# Patient Record
Sex: MALE | Race: WHITE | URBAN - METROPOLITAN AREA
[De-identification: names, ages, dates, MRNs, and addresses within clinical notes are randomized per-mention and may not be internally consistent; named-entity substitution may affect disease eponyms.]

---

## 2017-06-01 DIAGNOSIS — R19.7 DIARRHEA, UNSPECIFIED TYPE: ICD-10-CM

## 2017-06-01 LAB
OCCULT BLOOD QC: NORMAL
OCCULT BLOOD SCREENING: NORMAL

## 2018-11-26 ENCOUNTER — TELEPHONE (OUTPATIENT)
Dept: PULMONOLOGY | Age: 43
End: 2018-11-26

## 2018-11-27 ENCOUNTER — OFFICE VISIT (OUTPATIENT)
Dept: PULMONOLOGY | Age: 43
End: 2018-11-27

## 2018-11-27 VITALS
SYSTOLIC BLOOD PRESSURE: 128 MMHG | HEIGHT: 73 IN | HEART RATE: 85 BPM | BODY MASS INDEX: 36.31 KG/M2 | WEIGHT: 274 LBS | DIASTOLIC BLOOD PRESSURE: 80 MMHG | OXYGEN SATURATION: 97 %

## 2018-11-27 DIAGNOSIS — E66.9 NON MORBID OBESITY, UNSPECIFIED OBESITY TYPE: ICD-10-CM

## 2018-11-27 DIAGNOSIS — K21.9 GERD WITHOUT ESOPHAGITIS: Chronic | ICD-10-CM

## 2018-11-27 DIAGNOSIS — G47.33 OBSTRUCTIVE SLEEP APNEA SYNDROME: Primary | Chronic | ICD-10-CM

## 2018-11-27 PROCEDURE — 99213 OFFICE O/P EST LOW 20 MIN: CPT | Performed by: INTERNAL MEDICINE

## 2018-11-27 ASSESSMENT — ENCOUNTER SYMPTOMS
STRIDOR: 0
PHOTOPHOBIA: 0
SHORTNESS OF BREATH: 0
EYE PAIN: 0
CHEST TIGHTNESS: 0
SINUS PRESSURE: 0

## 2018-11-27 ASSESSMENT — SLEEP AND FATIGUE QUESTIONNAIRES
HOW LIKELY ARE YOU TO NOD OFF OR FALL ASLEEP IN A CAR, WHILE STOPPED FOR A FEW MINUTES IN TRAFFIC: 0
HOW LIKELY ARE YOU TO NOD OFF OR FALL ASLEEP WHILE SITTING AND READING: 1
ESS TOTAL SCORE: 5
HOW LIKELY ARE YOU TO NOD OFF OR FALL ASLEEP WHILE SITTING AND TALKING TO SOMEONE: 0
HOW LIKELY ARE YOU TO NOD OFF OR FALL ASLEEP WHILE SITTING QUIETLY AFTER LUNCH WITHOUT ALCOHOL: 0
HOW LIKELY ARE YOU TO NOD OFF OR FALL ASLEEP WHILE WATCHING TV: 2
HOW LIKELY ARE YOU TO NOD OFF OR FALL ASLEEP WHILE SITTING INACTIVE IN A PUBLIC PLACE: 0
HOW LIKELY ARE YOU TO NOD OFF OR FALL ASLEEP WHILE LYING DOWN TO REST IN THE AFTERNOON WHEN CIRCUMSTANCES PERMIT: 1
HOW LIKELY ARE YOU TO NOD OFF OR FALL ASLEEP WHEN YOU ARE A PASSENGER IN A CAR FOR AN HOUR WITHOUT A BREAK: 1

## 2018-11-27 NOTE — PROGRESS NOTES
shortness of breath and stridor. Cardiovascular: Negative for chest pain, palpitations and leg swelling. Musculoskeletal: Negative for neck pain and neck stiffness. Psychiatric/Behavioral: Positive for sleep disturbance. Vitals:  Weight BMI   Wt Readings from Last 3 Encounters:   11/27/18 274 lb (124.3 kg)   05/30/17 263 lb 14.3 oz (119.7 kg)   02/17/16 264 lb (119.7 kg)    Body mass index is 36.15 kg/m².      BP HR SaO2   BP Readings from Last 3 Encounters:   11/27/18 128/80   05/30/17 126/64   02/17/16 (!) 129/92    Pulse Readings from Last 3 Encounters:   11/27/18 85   05/30/17 100   02/17/16 77    SpO2 Readings from Last 3 Encounters:   11/27/18 97%   05/30/17 94%   02/17/16 96%          Electronically signed by Charbel Pacheco MD on 11/27/2018 at 3:13 PM

## 2018-11-27 NOTE — LETTER
[facility]  [address]  [facility] [address]  Phone: [phone]  Fax: [phone]    November 27, 2018       Patient: Renee Glover   MR Number: [id number]   YOB: 1975   Date of Visit: 11/27/2018       Cedric Tinajero was seen for a follow up visit today. Here is my assessment and plan as well as an attached copy of his visit today:    Obstructive sleep apnea syndrome  Chronic- worsening. Reviewed compliance download with pt. Supplies and parts as needed for his machine. These are medically necessary. Continue medications per his PCP and other physicians. Limit caffeine use after 3pm.       GERD without esophagitis  Chronic- Stable. Cont meds per PCP and other physicians. Non morbid obesity, unspecified obesity type  Chronic-Stable. Encouraged him to work on weight loss through diet and exercise. If you have questions or concerns, please do not hesitate to call me. I look forward to following Judi Batista along with you.     Sincerely,    Kami Hazel MD    CC providers:  Aramis Orellana MD  [address] [place] [address] Street: [phone]

## 2019-02-19 ENCOUNTER — OFFICE VISIT (OUTPATIENT)
Dept: PULMONOLOGY | Age: 44
End: 2019-02-19
Payer: COMMERCIAL

## 2019-02-19 VITALS
DIASTOLIC BLOOD PRESSURE: 80 MMHG | HEIGHT: 73 IN | HEART RATE: 81 BPM | WEIGHT: 285 LBS | SYSTOLIC BLOOD PRESSURE: 130 MMHG | OXYGEN SATURATION: 98 % | BODY MASS INDEX: 37.77 KG/M2

## 2019-02-19 DIAGNOSIS — K21.9 GASTROESOPHAGEAL REFLUX DISEASE WITHOUT ESOPHAGITIS: Chronic | ICD-10-CM

## 2019-02-19 DIAGNOSIS — G47.33 OBSTRUCTIVE SLEEP APNEA SYNDROME: Primary | Chronic | ICD-10-CM

## 2019-02-19 DIAGNOSIS — E66.9 CLASS 2 OBESITY WITHOUT SERIOUS COMORBIDITY WITH BODY MASS INDEX (BMI) OF 37.0 TO 37.9 IN ADULT, UNSPECIFIED OBESITY TYPE: Chronic | ICD-10-CM

## 2019-02-19 PROBLEM — E78.00 PURE HYPERCHOLESTEROLEMIA: Status: ACTIVE | Noted: 2017-06-06

## 2019-02-19 PROBLEM — L57.0 SENILE HYPERKERATOSIS: Status: ACTIVE | Noted: 2019-02-19

## 2019-02-19 PROBLEM — M23.41 LOOSE BODY OF RIGHT KNEE: Status: ACTIVE | Noted: 2018-08-13

## 2019-02-19 PROBLEM — B07.0 VERRUCA PLANTARIS: Status: ACTIVE | Noted: 2019-02-19

## 2019-02-19 PROBLEM — M17.11 PRIMARY OSTEOARTHRITIS OF RIGHT KNEE: Status: ACTIVE | Noted: 2018-08-13

## 2019-02-19 PROBLEM — D23.5 BENIGN NEOPLASM OF SKIN OF TRUNK, EXCEPT SCROTUM: Status: ACTIVE | Noted: 2019-02-19

## 2019-02-19 PROBLEM — L81.9 DYSCHROMIA: Status: ACTIVE | Noted: 2019-02-19

## 2019-02-19 PROBLEM — B07.9 WARTS: Status: ACTIVE | Noted: 2019-02-19

## 2019-02-19 PROCEDURE — 99213 OFFICE O/P EST LOW 20 MIN: CPT | Performed by: NURSE PRACTITIONER

## 2019-02-19 ASSESSMENT — ENCOUNTER SYMPTOMS
COUGH: 0
RHINORRHEA: 0
ABDOMINAL DISTENTION: 0
APNEA: 0
SHORTNESS OF BREATH: 0
EYE PAIN: 0
SINUS PRESSURE: 0
EYE REDNESS: 0
ABDOMINAL PAIN: 0

## 2019-02-19 ASSESSMENT — SLEEP AND FATIGUE QUESTIONNAIRES
HOW LIKELY ARE YOU TO NOD OFF OR FALL ASLEEP WHILE LYING DOWN TO REST IN THE AFTERNOON WHEN CIRCUMSTANCES PERMIT: 1
HOW LIKELY ARE YOU TO NOD OFF OR FALL ASLEEP IN A CAR, WHILE STOPPED FOR A FEW MINUTES IN TRAFFIC: 0
HOW LIKELY ARE YOU TO NOD OFF OR FALL ASLEEP WHILE SITTING AND READING: 2
HOW LIKELY ARE YOU TO NOD OFF OR FALL ASLEEP WHILE WATCHING TV: 2
ESS TOTAL SCORE: 6
HOW LIKELY ARE YOU TO NOD OFF OR FALL ASLEEP WHILE SITTING AND TALKING TO SOMEONE: 0
HOW LIKELY ARE YOU TO NOD OFF OR FALL ASLEEP WHEN YOU ARE A PASSENGER IN A CAR FOR AN HOUR WITHOUT A BREAK: 1
HOW LIKELY ARE YOU TO NOD OFF OR FALL ASLEEP WHILE SITTING QUIETLY AFTER LUNCH WITHOUT ALCOHOL: 0
HOW LIKELY ARE YOU TO NOD OFF OR FALL ASLEEP WHILE SITTING INACTIVE IN A PUBLIC PLACE: 0

## 2019-04-09 ENCOUNTER — TELEPHONE (OUTPATIENT)
Dept: PULMONOLOGY | Age: 44
End: 2019-04-09

## 2019-04-09 NOTE — TELEPHONE ENCOUNTER
LM ON VM - NEEDING TO RESCHEDULE SINCE HE DID NOT RECEIVE A NEW MACHINE HE WILL JUST NEED TO BE RESCHEDULE A YEAR FROM HIS LAST VISIT WHICH WAS 02/19/19 [email or web address]    Even-up Shoe

## 2019-12-23 ENCOUNTER — OFFICE VISIT (OUTPATIENT)
Dept: PULMONOLOGY | Age: 44
End: 2019-12-23
Payer: COMMERCIAL

## 2019-12-23 VITALS
HEIGHT: 73 IN | OXYGEN SATURATION: 98 % | BODY MASS INDEX: 39.49 KG/M2 | HEART RATE: 74 BPM | DIASTOLIC BLOOD PRESSURE: 72 MMHG | WEIGHT: 298 LBS | SYSTOLIC BLOOD PRESSURE: 138 MMHG

## 2019-12-23 DIAGNOSIS — E66.9 CLASS 2 OBESITY WITHOUT SERIOUS COMORBIDITY WITH BODY MASS INDEX (BMI) OF 37.0 TO 37.9 IN ADULT, UNSPECIFIED OBESITY TYPE: Chronic | ICD-10-CM

## 2019-12-23 DIAGNOSIS — G47.33 OBSTRUCTIVE SLEEP APNEA SYNDROME: Primary | Chronic | ICD-10-CM

## 2019-12-23 DIAGNOSIS — K21.9 GASTROESOPHAGEAL REFLUX DISEASE WITHOUT ESOPHAGITIS: Chronic | ICD-10-CM

## 2019-12-23 PROCEDURE — 99214 OFFICE O/P EST MOD 30 MIN: CPT | Performed by: NURSE PRACTITIONER

## 2019-12-23 RX ORDER — DOXEPIN HYDROCHLORIDE 10 MG/ML
6 SOLUTION ORAL NIGHTLY
Qty: 30 ML | Refills: 5 | Status: SHIPPED | OUTPATIENT
Start: 2019-12-23 | End: 2021-07-26

## 2019-12-23 RX ORDER — ROSUVASTATIN CALCIUM 5 MG/1
5 TABLET, COATED ORAL DAILY
COMMUNITY
Start: 2019-11-25

## 2019-12-23 ASSESSMENT — SLEEP AND FATIGUE QUESTIONNAIRES
HOW LIKELY ARE YOU TO NOD OFF OR FALL ASLEEP WHILE SITTING AND TALKING TO SOMEONE: 0
HOW LIKELY ARE YOU TO NOD OFF OR FALL ASLEEP WHILE SITTING QUIETLY AFTER LUNCH WITHOUT ALCOHOL: 0
HOW LIKELY ARE YOU TO NOD OFF OR FALL ASLEEP WHEN YOU ARE A PASSENGER IN A CAR FOR AN HOUR WITHOUT A BREAK: 1
HOW LIKELY ARE YOU TO NOD OFF OR FALL ASLEEP IN A CAR, WHILE STOPPED FOR A FEW MINUTES IN TRAFFIC: 0
HOW LIKELY ARE YOU TO NOD OFF OR FALL ASLEEP WHILE SITTING AND READING: 1
HOW LIKELY ARE YOU TO NOD OFF OR FALL ASLEEP WHILE SITTING INACTIVE IN A PUBLIC PLACE: 0
ESS TOTAL SCORE: 7
HOW LIKELY ARE YOU TO NOD OFF OR FALL ASLEEP WHILE LYING DOWN TO REST IN THE AFTERNOON WHEN CIRCUMSTANCES PERMIT: 2
HOW LIKELY ARE YOU TO NOD OFF OR FALL ASLEEP WHILE WATCHING TV: 3

## 2019-12-23 ASSESSMENT — ENCOUNTER SYMPTOMS
ABDOMINAL DISTENTION: 0
SINUS PRESSURE: 0
EYE REDNESS: 0
RHINORRHEA: 0
ABDOMINAL PAIN: 0
SHORTNESS OF BREATH: 0
APNEA: 0
COUGH: 0
EYE PAIN: 0

## 2019-12-24 ENCOUNTER — TELEPHONE (OUTPATIENT)
Dept: PULMONOLOGY | Age: 44
End: 2019-12-24

## 2020-04-13 ENCOUNTER — VIRTUAL VISIT (OUTPATIENT)
Dept: PULMONOLOGY | Age: 45
End: 2020-04-13
Payer: COMMERCIAL

## 2020-04-13 PROBLEM — E66.812 CLASS 2 SEVERE OBESITY DUE TO EXCESS CALORIES WITH SERIOUS COMORBIDITY AND BODY MASS INDEX (BMI) OF 39.0 TO 39.9 IN ADULT: Chronic | Status: ACTIVE | Noted: 2018-11-27

## 2020-04-13 PROBLEM — E66.01 CLASS 2 SEVERE OBESITY DUE TO EXCESS CALORIES WITH SERIOUS COMORBIDITY AND BODY MASS INDEX (BMI) OF 39.0 TO 39.9 IN ADULT (HCC): Status: ACTIVE | Noted: 2018-11-27

## 2020-04-13 PROBLEM — E66.01 CLASS 2 SEVERE OBESITY DUE TO EXCESS CALORIES WITH SERIOUS COMORBIDITY AND BODY MASS INDEX (BMI) OF 39.0 TO 39.9 IN ADULT (HCC): Chronic | Status: ACTIVE | Noted: 2018-11-27

## 2020-04-13 PROBLEM — E66.812 CLASS 2 SEVERE OBESITY DUE TO EXCESS CALORIES WITH SERIOUS COMORBIDITY AND BODY MASS INDEX (BMI) OF 39.0 TO 39.9 IN ADULT: Status: ACTIVE | Noted: 2018-11-27

## 2020-04-13 PROCEDURE — 99213 OFFICE O/P EST LOW 20 MIN: CPT | Performed by: INTERNAL MEDICINE

## 2020-04-13 RX ORDER — IBUPROFEN 200 MG
400 TABLET ORAL DAILY
COMMUNITY

## 2020-04-13 ASSESSMENT — SLEEP AND FATIGUE QUESTIONNAIRES
HOW LIKELY ARE YOU TO NOD OFF OR FALL ASLEEP WHILE WATCHING TV: 0
HOW LIKELY ARE YOU TO NOD OFF OR FALL ASLEEP WHILE SITTING AND READING: 2
ESS TOTAL SCORE: 5
HOW LIKELY ARE YOU TO NOD OFF OR FALL ASLEEP WHILE LYING DOWN TO REST IN THE AFTERNOON WHEN CIRCUMSTANCES PERMIT: 2
HOW LIKELY ARE YOU TO NOD OFF OR FALL ASLEEP IN A CAR, WHILE STOPPED FOR A FEW MINUTES IN TRAFFIC: 0
HOW LIKELY ARE YOU TO NOD OFF OR FALL ASLEEP WHILE SITTING QUIETLY AFTER LUNCH WITHOUT ALCOHOL: 0
HOW LIKELY ARE YOU TO NOD OFF OR FALL ASLEEP WHILE SITTING INACTIVE IN A PUBLIC PLACE: 1
HOW LIKELY ARE YOU TO NOD OFF OR FALL ASLEEP WHILE SITTING AND TALKING TO SOMEONE: 0
HOW LIKELY ARE YOU TO NOD OFF OR FALL ASLEEP WHEN YOU ARE A PASSENGER IN A CAR FOR AN HOUR WITHOUT A BREAK: 0

## 2020-04-13 ASSESSMENT — ENCOUNTER SYMPTOMS
SINUS PRESSURE: 0
EYE PAIN: 0
PHOTOPHOBIA: 0
SHORTNESS OF BREATH: 0
STRIDOR: 0
CHEST TIGHTNESS: 0

## 2020-04-13 NOTE — LETTER
[facility]  [address] [place] [address]  Phone: [phone]  Fax: [phone]    April 13, 2020       Patient: Evelin Key   MR Number: [id number]   YOB: 1975   Date of Visit: 4/13/2020       Reyes Gutierrez was seen for a follow up visit today. Here is my assessment and plan as well as an attached copy of his visit today:    Obstructive sleep apnea syndrome  Chronic- Stable but may not be fully controlled. Reviewed compliance download with pt. Supplies and parts as needed for his machine. These are medically necessary. Continue medications per his PCP and other physicians. Limit caffeine use after 3pm.  Will attempt to get his modem reactivated. Will do trial pressure increase to see if helps with patient staying asleep. Hold silenor for now. 3 month f/u. Acid reflux  Chronic- Stable. Cont meds per PCP and other physicians. Class 2 severe obesity due to excess calories with serious comorbidity and body mass index (BMI) of 39.0 to 39.9 in adult (HCC)  Chronic-Stable. Encouraged him to work on weight loss through diet and exercise. If you have questions or concerns, please do not hesitate to call me. I look forward to following Dianelys Duran [address] along with you.     Sincerely,    Kira Canales MD     providers:  Anel Kohli MD  [facility] [address] Street: [phone]

## 2020-04-13 NOTE — PROGRESS NOTES
on file   Other Topics Concern    Not on file   Social History Narrative    Not on file       Current Outpatient Medications   Medication Sig Dispense Refill    ibuprofen (ADVIL;MOTRIN) 200 MG tablet Take 400 mg by mouth daily      rosuvastatin (CRESTOR) 5 MG tablet Take 5 mg by mouth daily      doxepin (SINEQUAN) 10 MG/ML solution Take 0.6 mLs by mouth nightly 30 mL 5    omeprazole (PRILOSEC) 20 MG capsule Take 20 mg by mouth daily      lactobacillus (BACID) TABS Take 1 tablet by mouth 3 times daily (Patient not taking: Reported on 4/13/2020) 60 tablet 0    lactobacillus acidophilus (FLORANEX) PACK packet Take 1 packet by mouth 3 times daily (Patient not taking: Reported on 4/13/2020) 45 packet 0     No current facility-administered medications for this visit. Allergies as of 04/13/2020    (No Known Allergies)       Patient Active Problem List   Diagnosis    Acid reflux    Obstructive sleep apnea syndrome    Class 2 severe obesity due to excess calories with serious comorbidity and body mass index (BMI) of 39.0 to 39.9 in [facility])    Benign neoplasm of skin of trunk, except scrotum    Dyschromia    Loose body of right knee    Nonspecific abnormal results of liver function study    Other and unspecified hyperlipidemia    Pain in joint, shoulder region    Pure hypercholesterolemia    Primary osteoarthritis of right knee    Senile hyperkeratosis    Verruca plantaris    Warts       Past Medical History:   Diagnosis Date    GERD (gastroesophageal reflux disease)     Obstructive sleep apnea syndrome 12/9/2015    uncontrolled     Sleep apnea        History reviewed. No pertinent surgical history. Family History   Problem Relation Age of Onset    Cancer Mother     Hypertension Father            ROS:  Review of Systems   Constitutional: Positive for fatigue. HENT: Negative for dental problem, ear pain, nosebleeds, sinus pressure and sneezing.     Eyes: Negative for photophobia, pain

## 2020-06-30 ENCOUNTER — TELEPHONE (OUTPATIENT)
Dept: PULMONOLOGY | Age: 45
End: 2020-06-30

## 2020-07-14 ENCOUNTER — VIRTUAL VISIT (OUTPATIENT)
Dept: PULMONOLOGY | Age: 45
End: 2020-07-14
Payer: COMMERCIAL

## 2020-07-14 PROCEDURE — 99215 OFFICE O/P EST HI 40 MIN: CPT | Performed by: NURSE PRACTITIONER

## 2020-07-14 ASSESSMENT — SLEEP AND FATIGUE QUESTIONNAIRES
HOW LIKELY ARE YOU TO NOD OFF OR FALL ASLEEP WHILE SITTING QUIETLY AFTER LUNCH WITHOUT ALCOHOL: 0
HOW LIKELY ARE YOU TO NOD OFF OR FALL ASLEEP WHILE WATCHING TV: 0
HOW LIKELY ARE YOU TO NOD OFF OR FALL ASLEEP WHILE LYING DOWN TO REST IN THE AFTERNOON WHEN CIRCUMSTANCES PERMIT: 0
HOW LIKELY ARE YOU TO NOD OFF OR FALL ASLEEP WHEN YOU ARE A PASSENGER IN A CAR FOR AN HOUR WITHOUT A BREAK: 0
HOW LIKELY ARE YOU TO NOD OFF OR FALL ASLEEP IN A CAR, WHILE STOPPED FOR A FEW MINUTES IN TRAFFIC: 0
HOW LIKELY ARE YOU TO NOD OFF OR FALL ASLEEP WHILE SITTING INACTIVE IN A PUBLIC PLACE: 0
ESS TOTAL SCORE: 0
HOW LIKELY ARE YOU TO NOD OFF OR FALL ASLEEP WHILE SITTING AND READING: 0
HOW LIKELY ARE YOU TO NOD OFF OR FALL ASLEEP WHILE SITTING AND TALKING TO SOMEONE: 0

## 2020-07-14 NOTE — PROGRESS NOTES
Brandon Fowler MD, FAASM, [facility]  Alcester Martin, MSN, RN, CNP     [address] [facility]  [address] [address]  Dept: [phone]  Dept Fax: : [facility]  [address] [place] [address] [phone]    Subjective:     Patient ID: Erinn Rowan is a 53 y.o. male. Chief Complaint   Patient presents with    Sleep Apnea       HPI:      Sleep Medicine Video Visit    Pursuant to the emergency declaration under the [address] [place] [address], Critical access hospital waiver authority and the [employer] Act this Telephone Visit was insisted, with patient's consent, to reduce the patient's risk of exposure to COVID-19 and provide continuity of care for an established patient. Services were provided through a synchronous discussion over a telephone and/or Video chat to substitute for in-person clinic visit, and coded as such. While patient is at home. Machine Modem/Download Info:  Compliance (hours/night): 6.4 hrs/night  Download AHI (/hour): 0.8 /HR     Average IPAP Pressure: 9.2 cmH2O  Average EPAP Pressure: 5.1 cmH2O         AUTO BIPAP - Settings (Murray)  IPAP Max: 15 cmH2O  EPAP Min: 4 cmH2O  Pressure Support Min: 3  Pressure Support Max: 6             Comfort Settings  Humidity Level (0-8): 3  Flex/EPR (0-3): 3 PAP Mask  Mask Type: Nasal mask     [place] - Total score: 0    Follow-up :     Last Visit : April 2020      Patient reports the listed chronic Co-morbidities: Obesity, insomnia   are well controlled and stable at this time.      Subjective Health Changes: None      Over Night Oximetry: [] Yes  [] No  [x] NA [] WNL   Using O2: [] Yes  [] No  [x] NA   Patient is compliant with the machine  [x] Yes  [] No   Feeling rested when using the machine   [x] Yes  [] No Pressure is comfortable with inspiration and expiration  [x] Yes  [] No     Noticed changes in pressure   [] Yes  [] No  [x] NA   Mask is fitting well  [x] Yes  [] No   Noting Mask Air Leak  [] Yes  [x] No   Having painful Aerophagia  [] Yes  [x] No   Nocturia   0-1  per night. Having  HA upon waking  [] Yes  [x] No   Dry mouth upon waking   Dry Nose  Dry Eyes  [] Yes  [x] No   Congestion upon waking   [] Yes  [x] No    Nose Bleeds  [] Yes  [x] No   Using Sleep Aides  Doxepin 1-6mg    Understands how to change humidification and/or tubing temperature for comfort while at home  [x] Yes  [] No     Difficulties falling asleep  [] Yes  [x] No   Difficulties staying asleep  [x] Yes  [] No   Approximate time to bed  12-12:30am   Approximate wake time  6-7:30am   Taking Naps  no   If taking naps usual length    [x] NA   If taking naps using the machine  [] Yes  [] No  [x] NA [] With and With out    Drowsy when driving  [] Yes  [x] No     Does patient carry a DOT/CDL  [] Yes  [x] No     Does patient carry FAA/[employer] License   [] Yes  [x] No      Any concerns noted with the machine at this time  [] Yes  [x] No        Diagnosis Orders   1. Obstructive sleep apnea syndrome     2. Class 2 severe obesity due to excess calories with serious comorbidity and body mass index (BMI) of 39.0 to 39.9 in [facility])         The chronic medical conditions listed are directly related to the primary diagnosis listed above. The management of the primary diagnosis affects the secondary diagnosis and vice versa. Assessment/Plan:     Class 2 severe obesity due to excess calories with serious comorbidity and body mass index (BMI) of 39.0 to 39.9 in adult ([facility])  Chronic-Stable. Encouraged him to work on weight loss through diet and exercise. Obstructive sleep apnea syndrome  Reviewed compliance download with pt. Supplies and parts as needed for his machine. These are medically necessary.   Continue medications per his PCP and other physicians. Limit caffeine use after 3pm.  Encouraged him to work on weight loss through diet and exercise. The encounter diagnosis was Class 2 severe obesity due to excess calories with serious comorbidity and body mass index (BMI) of 39.0 to 39.9 in [facility]). The chronic medical conditions listed are directly related to the primary diagnosis listed above. The management of the primary diagnosis affects the secondary diagnosis and vice versa. The primary encounter diagnosis was Obstructive sleep apnea syndrome. A diagnosis of Class 2 severe obesity due to excess calories with serious comorbidity and body mass index (BMI) of 39.0 to 39.9 in [facility]) was also pertinent to this visit. The chronic medical conditions listed are directly related to the primary diagnosis listed above. The management of the primary diagnosis affects the secondary diagnosis and vice versa. - Educated patient and reviewed compliance download with pt.    -Supplies and parts as needed for his machine, these are medically necessary.    - Patient using Other [employer] for supplies  -Continue medications per his PCP and other physicians.   -Limit caffeine use after 3pm.    -Encouraged him to work on weight loss through diet and exercise. -  Patient able to access video feed. Visit completed via video chat communications. 30 min spent with patient.   - Education on options for new machine (will reach out to DME to see when he is eligible) will also have DME see if there is an option for a cradle for the dream wear or not. Will continue liquid Doxepin just for weekends to catch up. Insomnia may be related to work stressors  -F/U: 12 month if not able to get a new machine 3 months if able to get a new machine will wait for DME to call back . No orders of the defined types were placed in this encounter. No orders of the defined types were placed in this encounter.       No orders of the defined types were placed in

## 2020-07-14 NOTE — LETTER
[facility]  [address] [address]  Phone: [phone]  Fax: [phone]    July 14, 2020       Patient: Erinn Rowan   MR Number: [id number]   YOB: 1975   Date of Visit: 7/14/2020       Jalen العلي was seen for a follow up visit today. Here is my assessment and plan as well as an attached copy of his visit today:    Class 2 severe obesity due to excess calories with serious comorbidity and body mass index (BMI) of 39.0 to 39.9 in adult ([facility] [address]  Chronic-Stable. Encouraged him to work on weight loss through diet and exercise. Obstructive sleep apnea syndrome  Reviewed compliance download with pt. Supplies and parts as needed for his machine. These are medically necessary. Continue medications per his PCP and other physicians. Limit caffeine use after 3pm.  Encouraged him to work on weight loss through diet and exercise. The encounter diagnosis was Class 2 severe obesity due to excess calories with serious comorbidity and body mass index (BMI) of 39.0 to 39.9 in adult [facility]). The chronic medical conditions listed are directly related to the primary diagnosis listed above. The management of the primary diagnosis affects the secondary diagnosis and vice versa. If you have questions or concerns, please do not hesitate to call me. I look forward to following Lucero Mackey along with you.     Sincerely,    FAUSTO Betancourt - CNP    CC providers:  Jyothi Stinson MD  [facility] [address] Street: [phone]

## 2020-07-20 ENCOUNTER — TELEPHONE (OUTPATIENT)
Dept: PULMONOLOGY | Age: 45
End: 2020-07-20

## 2020-07-20 NOTE — TELEPHONE ENCOUNTER
----- Message from FAUSTO Garsia CNP sent at 7/15/2020  9:22 AM EDT -----  Can we please reach out to the patient and let him know he may obtain a new unit. 12/2020.  Please as him to call or mychart us the first week of December so that we can order the unit and he will need an appointment in approximately March 2021 for [facility]

## 2020-07-21 NOTE — TELEPHONE ENCOUNTER
It is the new \"thicker\" dream wear head gear and no it is not available as we just spoke about last week he will need to speak to [employer] about that

## 2020-12-21 NOTE — PROGRESS NOTES
Roly Card         : 1975 - A1    Diagnosis: [x] JOSE (G47.33) [] CSA (G47.31) [] Apnea (G47.30)   Length of Need: [x] 5 Months [] 99 Months [] Other:    Machine (BEN!): [] Respironics Dream Station      Auto [] ResMed AirSense     Auto [] Other:     []  CPAP () [] Bilevel ()   Mode: [] Auto [] Spontaneous    Mode: [] Auto [] Spontaneous                            Comfort Settings:   - Ramp Pressure:  cmH2O                                        - Ramp time: 15 min                                     -  Flex/EPR - 3 full time                                    - For ResMed Bilevel (TiMax-4 sec   TiMin- 0.2 sec)     Humidifier: [x] Heated ()        [x] Water chamber replacement ()/ 1 per 6 months        Mask:   [x] Nasal () /1 per 3 months [] Full Face () /1 per 3 months   [x] Patient choice -Size and fit mask [] Patient Choice - Size and fit mask   [] Dispense:  [] Dispense:    [x] Headgear () / 1 per 3 months [] Headgear () / 1 per 3 months   [x] Replacement Nasal Cushion ()/2 per month [] Interface Replacement ()/1 per month   [] Replacement Nasal Pillows ()/2 per month         Tubing: [x] Heated ()/1 per 3 months    [] Standard ()/1 per 3 months [] Other:           Filters: [x] Non-disposable ()/1 per 6 months     [x] Ultra-Fine, Disposable ()/2 per month        Miscellaneous: [] Chin Strap ()/ 1 per 6 months [] O2 bleed-in:       LPM   [] Oximetry on CPAP/Bilevel []  Other:    [] Modem: ()         Start Order Date: 20    MEDICAL JUSTIFICATION:  I, the undersigned, certify that the above prescribed supplies are medically necessary for this patients wellbeing. In my opinion, the supplies are both reasonable and necessary in reference to accepted standards of medicalpractice in treatment of this patients condition.     FAUSTO Crenshaw - SANJIV      NPI: [id number]       Order Signed Date: 20    Electronically signed by FAUSTO Harden CNP on 2020 at 7:49 AM    Greeley Alpha  1975 [address] [place] [address]  [phone] (home)   [phone] (mobile)      Insurance Info (confirm with patient if correct):  Payor/Plan Subscr  Sex Relation Sub.  Ins. ID Effective Group Num

## 2021-07-22 ENCOUNTER — TELEPHONE (OUTPATIENT)
Dept: PULMONOLOGY | Age: 46
End: 2021-07-22

## 2021-07-22 NOTE — TELEPHONE ENCOUNTER
We fully understand although this new notification can be alarming. For the direct recall protocols and procedures you will need to contact your DME and/Or Respironics directly. At this time if you are comfortable and not experiencing symptoms (cough/unusual odors) we advise you continue to use the machine. As the recall was voluntary, the actual risk is very low. We do advise that the rooms where the machine is utilized be kept warm or cool. I am sorry for this changing times and we too are waiting for further directions from the company.

## 2021-07-22 NOTE — TELEPHONE ENCOUNTER
Spoke with pt. Pt has spoken to his insurance and with proper documentation he will be able to get a new unit. Pt has an appointment with Ric Domingo NP on 07/26/2021 and was asked to talk with her about ordering the new unit.

## 2021-07-26 ENCOUNTER — VIRTUAL VISIT (OUTPATIENT)
Dept: PULMONOLOGY | Age: 46
End: 2021-07-26
Payer: COMMERCIAL

## 2021-07-26 DIAGNOSIS — K21.00 GASTROESOPHAGEAL REFLUX DISEASE WITH ESOPHAGITIS WITHOUT HEMORRHAGE: Chronic | ICD-10-CM

## 2021-07-26 DIAGNOSIS — G47.33 OBSTRUCTIVE SLEEP APNEA SYNDROME: Primary | Chronic | ICD-10-CM

## 2021-07-26 DIAGNOSIS — E66.01 CLASS 2 SEVERE OBESITY DUE TO EXCESS CALORIES WITH SERIOUS COMORBIDITY AND BODY MASS INDEX (BMI) OF 39.0 TO 39.9 IN ADULT (HCC): Chronic | ICD-10-CM

## 2021-07-26 PROCEDURE — 99214 OFFICE O/P EST MOD 30 MIN: CPT | Performed by: NURSE PRACTITIONER

## 2021-07-26 ASSESSMENT — SLEEP AND FATIGUE QUESTIONNAIRES
HOW LIKELY ARE YOU TO NOD OFF OR FALL ASLEEP WHILE LYING DOWN TO REST IN THE AFTERNOON WHEN CIRCUMSTANCES PERMIT: 1
HOW LIKELY ARE YOU TO NOD OFF OR FALL ASLEEP IN A CAR, WHILE STOPPED FOR A FEW MINUTES IN TRAFFIC: 0
HOW LIKELY ARE YOU TO NOD OFF OR FALL ASLEEP WHEN YOU ARE A PASSENGER IN A CAR FOR AN HOUR WITHOUT A BREAK: 0
HOW LIKELY ARE YOU TO NOD OFF OR FALL ASLEEP WHILE SITTING AND TALKING TO SOMEONE: 0
HOW LIKELY ARE YOU TO NOD OFF OR FALL ASLEEP WHILE WATCHING TV: 1
ESS TOTAL SCORE: 3
HOW LIKELY ARE YOU TO NOD OFF OR FALL ASLEEP WHILE SITTING QUIETLY AFTER LUNCH WITHOUT ALCOHOL: 0
HOW LIKELY ARE YOU TO NOD OFF OR FALL ASLEEP WHILE SITTING INACTIVE IN A PUBLIC PLACE: 0
HOW LIKELY ARE YOU TO NOD OFF OR FALL ASLEEP WHILE SITTING AND READING: 1

## 2021-07-26 NOTE — PROGRESS NOTES
Raven Grade         : 1975    Diagnosis: [x] JOSE (G47.33) [] CSA (G47.31) [] Apnea (G47.30)   Length of Need: [x] 12 Months [] 99 Months [] Other:    Machine (BEN!): [] Respironics Dream Station   2   Auto [] ResMed AirSense     Auto [] Other:     []  CPAP () [] Bilevel ()   Mode: [] Auto [] Spontaneous    Mode: [] Auto [] Spontaneous                       Comfort Settings:   - Ramp Pressure:  cmH2O                                        - Ramp time: 15 min                                     -  Flex/EPR - 3 full time                                    - For ResMed Bilevel (TiMax-4 sec   TiMin- 0.2 sec)     Humidifier: [x] Heated ()        [x] Water chamber replacement ()/ 1 per 6 months        Mask:   [x] Nasal () /1 per 3 months [] Full Face () /1 per 3 months   [x] Patient choice -Size and fit mask [] Patient Choice - Size and fit mask   [] Dispense:  [] Dispense:    [x] Headgear () / 1 per 3 months [] Headgear () / 1 per 3 months   [x] Replacement Nasal Cushion ()/2 per month [] Interface Replacement ()/1 per month   [] Replacement Nasal Pillows ()/2 per month         Tubing: [x] Heated ()/1 per 3 months    [] Standard ()/1 per 3 months [] Other:           Filters: [x] Non-disposable ()/1 per 6 months     [x] Ultra-Fine, Disposable ()/2 per month        Miscellaneous: [] Chin Strap ()/ 1 per 6 months [] O2 bleed-in:       LPM   [] Oximetry on CPAP/Bilevel []  Other:    [x] Modem: ()         Start Order Date: 21    MEDICAL JUSTIFICATION:  I, the undersigned, certify that the above prescribed supplies are medically necessary for this patients wellbeing. In my opinion, the supplies are both reasonable and necessary in reference to accepted standards of medicalpractice in treatment of this patients condition.     FAUSTO Kate - CNP      NPI: [id number]       Order Signed Date: 21    Electronically signed by FAUSTO White CNP on 2021 at 8:46 AM    Joyce Osuna  1975  [address]  [address] [facility] [place] [address] [phone] (home)   [phone] (mobile)      Insurance Info (confirm with patient if correct):  Payor/Plan Subscr  Sex Relation Sub.  Ins. ID Effective Group Num

## 2021-07-26 NOTE — PROGRESS NOTES
Chris Coronado         : 1975    Diagnosis: [x] JOSE (G47.33) [] CSA (G47.31) [] Apnea (G47.30)   Length of Need: [x] 12 Months [] 99 Months [] Other:    Machine (BEN!): [] Respironics Dream Station   2   Auto [x] ResMed AirSense     Auto [] Other:     []  CPAP () [x] Bilevel ()   Mode: [] Auto [] Spontaneous    Mode: [x] Auto [] Spontaneous              AUTO BILEVEL - Settings (ResMed)  IPAP Max: 25 cmH2O  EPAP Min: 4 cmH2O  Pressure Support: 3        Comfort Settings:   - Ramp Pressure: 4 cmH2O                                        - Ramp time: 15 min                                     -  Flex/EPR - 3 full time                                    - For ResMed Bilevel (TiMax-4 sec   TiMin- 0.2 sec)     Humidifier: [x] Heated ()        [x] Water chamber replacement ()/ 1 per 6 months        Mask:   [] Nasal () /1 per 3 months [] Full Face () /1 per 3 months   [] Patient choice -Size and fit mask [] Patient Choice - Size and fit mask   [] Dispense:  [] Dispense:    [] Headgear () / 1 per 3 months [] Headgear () / 1 per 3 months   [] Replacement Nasal Cushion ()/2 per month [] Interface Replacement ()/1 per month   [] Replacement Nasal Pillows ()/2 per month         Tubing: [x] Heated ()/1 per 3 months    [] Standard ()/1 per 3 months [] Other:           Filters: [x] Non-disposable ()/1 per 6 months     [x] Ultra-Fine, Disposable ()/2 per month        Miscellaneous: [] Chin Strap ()/ 1 per 6 months [] O2 bleed-in:       LPM   [] Oximetry on CPAP/Bilevel []  Other:    [x] Modem: ()         Start Order Date: 21    MEDICAL JUSTIFICATION:  I, the undersigned, certify that the above prescribed supplies are medically necessary for this patients wellbeing.   In my opinion, the supplies are both reasonable and necessary in reference to accepted standards of medicalpractice in treatment of this patients condition. FAUSTO Mcelroy CNP      NPI: [id number]       Order Signed Date: 21    Electronically signed by FAUSTO Mcelroy CNP on 2021 at 8:55 AM    Mayra Fritz  1975 [address] [place] [address]  [phone] (home)   [phone] (mobile)      Insurance Info (confirm with patient if correct):  Payor/Plan Subscr  Sex Relation Sub.  Ins. ID Effective Group Num

## 2021-07-26 NOTE — PROGRESS NOTES
Jonathan Guzman MD, FAASM, [facility]  Jag Rhodes, MSN, RN, [address] [address]  Dept: [phone]  Dept Fax: [phone]  Loc: [phone]    Subjective:     Patient ID: Arely Kerns is a 50 y.o. male. Chief Complaint   Patient presents with    Sleep Apnea       HPI:      Sleep Medicine Video Visit    Pursuant to the emergency declaration under the [address] [place] [address] [facility] authority and the [employer] and [employer] Act this Telephone Visit was insisted, with patient's consent, to reduce the patient's risk of exposure to COVID-19 and provide continuity of care for an established patient. Services were provided through a synchronous discussion over a telephone and/or Video chat to substitute for in-person clinic visit, and coded as such. While patient is at home. Machine Modem/Download Info:                                        PAP Mask  Mask Type: Nasal mask     [place] - Total score: 3    Follow-up :     Last Visit : July 2020      Subjective Health Changes: none      Over Night Oximetry: [] Yes  [] No  [x] NA [] WNL   Using O2: [] Yes  [] No  [x] NA   Patient is compliant with the machine  [x] Yes  [] No [] Per patient   Feeling rested when using the machine   [x] Yes  [] No     Pressure is comfortable with inspiration and expiration  [x] Yes  [] No   ([x] NA   [] Feeling of suffocation  [] Feeling like not enough air    [] To much pressure)     Noticed changes in pressure  [x] NA  [] Yes    [] No     Mask is fitting well  [x] Yes  [] No   Noting Mask Air Leak  [] Yes  [x] No   Having painful Aerophagia  [] Yes  [x] No   Nocturia   1-6  per night.    Having  HA upon waking  [] Yes  [x] No   Dry mouth upon waking   Dry Nose  Dry Eyes  [x] Yes  [] No   Congestion upon waking   [] Yes  [x] No    Nose Bleeds  [] Yes  [x] No Using Sleep Aides  [x] NA  [] OTC  [] Per our office   [] Per another provider   Understands how to change humidification and/or tubing temperature for comfort while at home  [x] Yes  [] No     Difficulties falling asleep  [] Yes  [x] No   Difficulties staying asleep  [] Yes  [x] No   Approximate time to bed  11:30am-12am   Approximate wake time  6-7am   Taking Naps  no   If taking naps usual length  [x] NA   If taking naps using the machine [x] NA  [] Yes    [] No    [] With and With out    Drowsy when driving  [] Yes  [x] No     Does patient carry a DOT/CDL  [] Yes  [x] No     Does patient carry FAA/[employer] License   [] Yes  [x] No      Any concerns noted with the machine at this time  [x] Yes  [] No    Patient machine is on recall patient has documentation of such. Assessment/Plan:     1. Obstructive sleep apnea syndrome  Assessment & Plan:  Unable to Review compliance download with pt. Supplies and parts as needed for his machine. These are medically necessary. Continue medications per his PCP and other physicians. Limit caffeine use after 3pm.    The chronic medical conditions listed are directly related to the primary diagnosis listed above. The management of the primary diagnosis affects the secondary diagnosis and vice versa    Per patient patient IS compliant. At this time I am unable to assess this, and this could increase the risk of heart attacks, strokes, car accidents and/or death if not using the machine nightly, and with each sleep. 2. Class 2 severe obesity due to excess calories with serious comorbidity and body mass index (BMI) of 39.0 to 39.9 in adult [facility])  Assessment & Plan:  Patient encouraged to work on maintaining a healthy weight per height. Achievable with diet restriction/modifications and exercise (may consult primary care if unsure of any restrictions or concerns).      Weight management directly correlates to risk of control and maintenance of Obstructive Sleep Apnea.      3. Gastroesophageal reflux disease with esophagitis without hemorrhage  Assessment & Plan:  Chronic- stable. After speaking with patient:    Agree with current plan, and would agree to continue this plan per prescribing and managing physician. The chronic medical conditions listed are directly related to the primary diagnosis listed above. The management of the primary diagnosis affects the secondary diagnosis and vice versa. - After pulling data and reviewing it   - Educated patient and reviewed down load from modem with the patient   - Continue medications per his PCP and other physicians.   - he instructed not to drive unless had 4 hrs of effective therapy for his JOSE the night before. - Did review the risks of under or untreated JOSE including, but not limited to, higher risks of motor vehicle accidents, stroke, heart attacks, and death. - he understands and accepts all these risks. - The patient is advised to use the machine every night.   - Patient using  [employer] for supplies  - Patient educated on   Office locations  Compliance  Follow up  After speaking to the patient, patient is currently stable.  We will continue with the current machine settings  Recall Information and DME contact    -Will follow up in office in 3 months      Electronically signed by FAUSTO Thompson CNP on 7/26/2021 at 8:50 AM

## 2021-07-26 NOTE — ASSESSMENT & PLAN NOTE
Unable to Review compliance download with pt. Supplies and parts as needed for his machine. These are medically necessary. Continue medications per his PCP and other physicians. Limit caffeine use after 3pm.    The chronic medical conditions listed are directly related to the primary diagnosis listed above. The management of the primary diagnosis affects the secondary diagnosis and vice versa    Per patient patient IS compliant. At this time I am unable to assess this, and this could increase the risk of heart attacks, strokes, car accidents and/or death if not using the machine nightly, and with each sleep.

## 2023-01-03 ENCOUNTER — TELEMEDICINE (OUTPATIENT)
Dept: PULMONOLOGY | Age: 48
End: 2023-01-03
Payer: COMMERCIAL

## 2023-01-03 ENCOUNTER — TELEPHONE (OUTPATIENT)
Dept: PULMONOLOGY | Age: 48
End: 2023-01-03

## 2023-01-03 DIAGNOSIS — G47.33 OBSTRUCTIVE SLEEP APNEA SYNDROME: Primary | Chronic | ICD-10-CM

## 2023-01-03 DIAGNOSIS — E66.01 CLASS 2 SEVERE OBESITY DUE TO EXCESS CALORIES WITH SERIOUS COMORBIDITY AND BODY MASS INDEX (BMI) OF 39.0 TO 39.9 IN ADULT (HCC): Chronic | ICD-10-CM

## 2023-01-03 PROCEDURE — 99214 OFFICE O/P EST MOD 30 MIN: CPT | Performed by: NURSE PRACTITIONER

## 2023-01-03 RX ORDER — HYDROXYCHLOROQUINE SULFATE 200 MG/1
200 TABLET, FILM COATED ORAL 2 TIMES DAILY
COMMUNITY
Start: 2022-12-21

## 2023-01-03 RX ORDER — UBIDECARENONE 100 MG
1 CAPSULE ORAL DAILY
COMMUNITY

## 2023-01-03 RX ORDER — ALLOPURINOL 100 MG/1
TABLET ORAL
COMMUNITY
Start: 2022-11-21

## 2023-01-03 ASSESSMENT — SLEEP AND FATIGUE QUESTIONNAIRES
HOW LIKELY ARE YOU TO NOD OFF OR FALL ASLEEP WHILE SITTING QUIETLY AFTER LUNCH WITHOUT ALCOHOL: 0
HOW LIKELY ARE YOU TO NOD OFF OR FALL ASLEEP WHILE SITTING INACTIVE IN A PUBLIC PLACE: 0
HOW LIKELY ARE YOU TO NOD OFF OR FALL ASLEEP WHILE SITTING AND READING: 1
HOW LIKELY ARE YOU TO NOD OFF OR FALL ASLEEP WHILE SITTING AND TALKING TO SOMEONE: 0
HOW LIKELY ARE YOU TO NOD OFF OR FALL ASLEEP WHILE WATCHING TV: 0
HOW LIKELY ARE YOU TO NOD OFF OR FALL ASLEEP IN A CAR, WHILE STOPPED FOR A FEW MINUTES IN TRAFFIC: 0
ESS TOTAL SCORE: 3
HOW LIKELY ARE YOU TO NOD OFF OR FALL ASLEEP WHEN YOU ARE A PASSENGER IN A CAR FOR AN HOUR WITHOUT A BREAK: 1
HOW LIKELY ARE YOU TO NOD OFF OR FALL ASLEEP WHILE LYING DOWN TO REST IN THE AFTERNOON WHEN CIRCUMSTANCES PERMIT: 1

## 2023-01-03 NOTE — LETTER
[facility] Sleep [facility]  [address] [facility]  [address] [address]  Phone: [phone]  Fax: [phone]    January 3, 2023       Patient: Horace Robles   MR Number: [id number]   YOB: 1975   Date of Visit: 1/3/2023       Malik Gupta was seen for a follow up visit today. Here is my assessment and plan as well as an attached copy of his visit today:    Class 2 severe obesity due to excess calories with serious comorbidity and body mass index (BMI) of 39.0 to 39.9 in adult [facility])   Chronic-not stable:  Discussed importance of treating obstructive sleep apnea and getting sufficient sleep to assist with weight control. Encouraged him to work on weight loss through diet and exercise. Recommended DASH or Mediterranean diets. Obstructive sleep apnea syndrome  Chronic-Stable: Reviewed and analyzed results of physiologic download from patient's machine and reviewed with patient. Supplies and parts as needed for his machine. These are medically necessary. Limit caffeine use after 3pm. Based on the analyzed data will change following settings: EPAP min increased to 6. Pressure changes sent via machine modem. Will trial pressure increase to help with sleep maintenance and daytime symptoms. Discussed how to use the ramp feature on his machine if needed as he acclimates to the new pressures. Discussed trying a chin strap or consider a FFM if oral leak and dryness persists. Will place order for new machine. Patients machine is currently over 11years old, humidifier is malfunctioning, and is broken beyond repair. Machine is medically necessary and he is gaining benefit from machine use. He previously failed CPAP and needed changed to bilevel. Will see him back between 31 and 90 days for new machine compliance. Encouraged him to contact the office with any questions or concerns. If you have questions or concerns, please do not hesitate to call me.  I look forward to following Alma Wheatland along with you.     Sincerely,    FAUSTO Dyer    CC providers:  MD Rosalva Live [facility] [address] [id number]  Via Fax: [phone]

## 2023-01-03 NOTE — PROGRESS NOTES
[facility]  [id number] [place] [address], [address] [facility] [phone]   [facility] Evelin Guidry. [address] [place] [address]. Andrea Mckeon [address] [phone]     Video Visit- Follow up    Marina Anderson, was evaluated through a synchronous (real-time) audio-video  encounter. The patient (or guardian if applicable) is aware that this is a billable  service, which includes applicable co-pays. This Virtual Visit was conducted with  patient's (and/or legal guardian's) consent. The visit was conducted pursuant to  the emergency declaration under the [address], [address] [place] [address] waiver authority and the [employer] and  [employer] Act. Patient identification was verified,  and a caregiver was present when appropriate. The patient was located in a  state where the provider was licensed to provide care. Assessment/Plan:      1. Obstructive sleep apnea syndrome  Assessment & Plan:  Chronic-Stable: Reviewed and analyzed results of physiologic download from patient's machine and reviewed with patient. Supplies and parts as needed for his machine. These are medically necessary. Limit caffeine use after 3pm. Based on the analyzed data will change following settings: EPAP min increased to 6. Pressure changes sent via machine modem. Will trial pressure increase to help with sleep maintenance and daytime symptoms. Discussed how to use the ramp feature on his machine if needed as he acclimates to the new pressures. Discussed trying a chin strap or consider a FFM if oral leak and dryness persists. Will place order for new machine. Patients machine is currently over 11years old, humidifier is malfunctioning, and is broken beyond repair. Machine is medically necessary and he is gaining benefit from machine use. He previously failed CPAP and needed changed to bilevel. Will see him back between 31 and 90 days for new machine compliance. Encouraged him to contact the office with any questions or concerns. 2. Class 2 severe obesity due to excess calories with serious comorbidity and body mass index (BMI) of 39.0 to 39.9 in [facility])  Assessment & Plan:   Chronic-not stable:  Discussed importance of treating obstructive sleep apnea and getting sufficient sleep to assist with weight control. Encouraged him to work on weight loss through diet and exercise. Recommended DASH or Mediterranean diets. Reviewed, analyzed, and documented physiologic data from patient's PAP machine. This information was analyzed to assess complexity and medical decision making in regards to further testing and management. The primary encounter diagnosis was Obstructive sleep apnea syndrome. A diagnosis of Class 2 severe obesity due to excess calories with serious comorbidity and body mass index (BMI) of 39.0 to 39.9 in [facility]) was also pertinent to this visit. The chronic medical conditions listed are directly related to the primary diagnosis listed above. The management of the primary diagnosis affects the secondary diagnosis and vice versa. Subjective:     Patient ID: Erica Celeste is a 50 y.o. male. Chief Complaint   Patient presents with    Sleep Apnea       Subjective   HPI:    Machine Modem/Download Info:  Compliance (hours/night): 6 hrs/night  % of nights >= 4 hrs: 96.7 %  Download AHI (/hour): 0.6 /HR   Average IPAP Pressure: 9 cmH2O  Average EPAP Pressure: 5 cmH2O         AUTO BIPAP - Settings (Murray)  IPAP Max: 15 cmH2O  EPAP Min: 4 cmH2O  Pressure Support Min: 3  Pressure Support Max: 6               PAP Mask  Mask Type: Nasal mask     Erica Celeste presents today for follow-up for sleep apnea. he reports he is doing well with his machine. He has received his replacement machine for the  recall. His machine is over 11years old and the humidifier is not working properly.  The pressure on his machine is comfortable and he is waking rested for the most part He will wake after about 6 hours of sleep and is unable to return to sleep. He feels he would do better with 7-8 hours of sleep. He does not nap during the day but would if he had time. At times has trouble with oral leak and dryness. he denies headaches, congestion, nosebleeds, aerophagia, or drowsiness while driving. his mask is comfortable and is fitting well. [address]    [place] Sleepiness Score: 3    Current Outpatient Medications   Medication Instructions    allopurinol (ZYLOPRIM) 100 MG tablet No dose, route, or frequency recorded.     coenzyme Q10 100 MG CAPS capsule 1 capsule, Oral, DAILY    hydroxychloroquine (PLAQUENIL) 200 mg, Oral, 2 TIMES DAILY    ibuprofen (ADVIL;MOTRIN) 400 mg, DAILY    lactobacillus (BACID) TABS 1 tablet, Oral, 3 TIMES DAILY    lactobacillus acidophilus (FLORANEX) PACK packet 1 packet, Oral, 3 TIMES DAILY    omeprazole (PRILOSEC) 20 mg, Oral, DAILY    rosuvastatin (CRESTOR) 5 mg, Oral, DAILY        Electronically signed by FAUSTO Abbasi on 1/3/2023 at 5:24 PM

## 2023-01-03 NOTE — PROGRESS NOTES
Diagnosis: [x] JOSE (G47.33) [] CSA (G47.31) [] Apnea (G47.30)   Length of Need: [x] 15 Months [] 99 Months [] Other:   Machine (BEN!): [] Respironics Dream Station      Auto [x] [employer] with modem for remote monitoring  [] Other:     []  CPAP () [x] Bilevel ()   Mode: [] Auto [] Spontaneous    Mode: [x] Auto [] Spontaneous              AUTO BIPAP - Settings   IPAP Max: 15 cmH2O  EPAP Min: 6 cmH2O  Pressure Support: 3             Comfort Settings: Ramp Pressure: 5 cmH2O  Ramp time: 15 min  Flex/EPR - 3 full time                                  For [employer]Med Bileve (TiMax-4 sec   TiMin- 0.2 sec)     Humidifier: [x] Heated ()        [x] Water chamber replacement ()/ 1 per 6 months        Mask:   [x] Nasal () /1 per 3 months [] Full Face () /1 per 3 months   [x] Patient choice -Size and fit mask [] Patient Choice - Size and fit mask   [] Dispense: [] Dispense:   [x] Headgear () / 1 per 3 months [] Headgear () / 1 per 3 months   [x] Replacement Nasal Cushion ()/2 per month [] Interface Replacement ()/1 per month   [] Replacement Nasal Pillows ()/2 per month         Tubing: [x] Heated ()/1 per 3 months    [] Standard ()/1 per 3 months [] Other:           Filters: [x] Non-disposable ()/1 per 6 months     [x] Ultra-Fine, Disposable ()/2 per month        Miscellaneous: [] Chin Strap ()/ 1 per 6 months [] O2 bleed-in:        LPM   [] Oxymetry on CPAP/Bilevel []  Other:         Start Order Date: 01/03/23    MEDICAL JUSTIFICATION:  I, the undersigned, certify that the above prescribed supplies are medically necessary for this patients wellbeing. In my opinion, the supplies are both reasonable and necessary in reference to accepted standards of medicalpractice in treatment of this patients condition.     Donny Portillo NP    NPI: [id number]       Order Signed Date: 01/03/23  Kyrie Quinones - Michael  Pulmonary, Sleep, and Critical Care    Pulmonary, Sleep, and Critical Care  [address] [facility]. [address] [facility], [address] [facility] , [address]                                    Jax Sanders  Phone: [phone]    Fax: [phone]    [facility]  1975  [address]  [address] [facility] [address]  [phone] (home)   [phone] (mobile)      Insurance Info (confirm with patient if correct):  Payer/Plan Subscr  Sex Relation Sub.  Ins. ID Effective Group Num

## 2023-01-03 NOTE — ASSESSMENT & PLAN NOTE
Chronic-Stable: Reviewed and analyzed results of physiologic download from patient's machine and reviewed with patient. Supplies and parts as needed for his machine. These are medically necessary. Limit caffeine use after 3pm. Based on the analyzed data will change following settings: EPAP min increased to 6. Pressure changes sent via machine modem. Will trial pressure increase to help with sleep maintenance and daytime symptoms. Discussed how to use the ramp feature on his machine if needed as he acclimates to the new pressures. Discussed trying a chin strap or consider a FFM if oral leak and dryness persists. Will place order for new machine. Patients machine is currently over 11years old, humidifier is malfunctioning, and is broken beyond repair. Machine is medically necessary and he is gaining benefit from machine use. He previously failed CPAP and needed changed to bilevel. Will see him back between 31 and 90 days for new machine compliance. Encouraged him to contact the office with any questions or concerns.

## 2023-01-03 NOTE — TELEPHONE ENCOUNTER
Mina Lua,  I am so sorry for the confusion, I do see where you uploaded it in [place]. So we do have the corrected info. I hope you have a good evening!     Thanks

## 2023-01-18 ENCOUNTER — TELEPHONE (OUTPATIENT)
Dept: PULMONOLOGY | Age: 48
End: 2023-01-18

## 2023-01-18 NOTE — TELEPHONE ENCOUNTER
Spoke with pt to let him know [facility] is not able to process his order at this time. Pt opted to have order sent to [facility].  Order sent

## 2023-04-18 ENCOUNTER — TELEMEDICINE (OUTPATIENT)
Dept: PULMONOLOGY | Age: 48
End: 2023-04-18
Payer: COMMERCIAL

## 2023-04-18 DIAGNOSIS — G47.33 OBSTRUCTIVE SLEEP APNEA SYNDROME: Chronic | ICD-10-CM

## 2023-04-18 DIAGNOSIS — E66.01 CLASS 2 SEVERE OBESITY DUE TO EXCESS CALORIES WITH SERIOUS COMORBIDITY AND BODY MASS INDEX (BMI) OF 39.0 TO 39.9 IN ADULT (HCC): Chronic | ICD-10-CM

## 2023-04-18 PROCEDURE — 99214 OFFICE O/P EST MOD 30 MIN: CPT | Performed by: INTERNAL MEDICINE

## 2023-04-18 ASSESSMENT — SLEEP AND FATIGUE QUESTIONNAIRES
HOW LIKELY ARE YOU TO NOD OFF OR FALL ASLEEP WHILE LYING DOWN TO REST IN THE AFTERNOON WHEN CIRCUMSTANCES PERMIT: 3
ESS TOTAL SCORE: 3
HOW LIKELY ARE YOU TO NOD OFF OR FALL ASLEEP WHILE SITTING INACTIVE IN A PUBLIC PLACE: 0
HOW LIKELY ARE YOU TO NOD OFF OR FALL ASLEEP IN A CAR, WHILE STOPPED FOR A FEW MINUTES IN TRAFFIC: 0
HOW LIKELY ARE YOU TO NOD OFF OR FALL ASLEEP WHEN YOU ARE A PASSENGER IN A CAR FOR AN HOUR WITHOUT A BREAK: 0
HOW LIKELY ARE YOU TO NOD OFF OR FALL ASLEEP WHILE WATCHING TV: 0
HOW LIKELY ARE YOU TO NOD OFF OR FALL ASLEEP WHILE SITTING AND READING: 0
HOW LIKELY ARE YOU TO NOD OFF OR FALL ASLEEP WHILE SITTING QUIETLY AFTER LUNCH WITHOUT ALCOHOL: 0
HOW LIKELY ARE YOU TO NOD OFF OR FALL ASLEEP WHILE SITTING AND TALKING TO SOMEONE: 0

## 2023-04-18 NOTE — ASSESSMENT & PLAN NOTE
Chronic-Stable: Reviewed and analyzed results of physiologic download from patient's machine and reviewed with patient. Supplies and parts as needed for his machine. These are medically necessary. Limit caffeine use after 3pm. Based on the analyzed data will change following settings: PS-4. The patient work with his DME company on fitting the full facemask to see if that helps control the leak and the dryness.

## 2023-04-18 NOTE — PROGRESS NOTES
Laura Ascencio CNP  Christie Leecastillo [facility] [address]  Kurtis Ji, [address] [facility]  [address] [facility] [phone] [address] [id number] [facility], [address] [place] [address] [phone]     Video Visit- Follow up    Yesenia Alba, was evaluated through a synchronous (real-time) audio-video  encounter. The patient (or guardian if applicable) is aware that this is a billable  service, which includes applicable co-pays. This Virtual Visit was conducted with  patient's (and/or legal guardian's) consent. The visit was conducted pursuant to  the emergency declaration under the [address] [place] [address] authority and the [employer] General Act. Patient identification was verified,  and a caregiver was present when appropriate. The patient was located in a  state where the provider was licensed to provide care. Assessment/Plan:      1. Obstructive sleep apnea syndrome  Assessment & Plan:  Chronic-Stable: Reviewed and analyzed results of physiologic download from patient's machine and reviewed with patient. Supplies and parts as needed for his machine. These are medically necessary. Limit caffeine use after 3pm. Based on the analyzed data will change following settings: PS-4. The patient work with his [employer] company on fitting the full facemask to see if that helps control the leak and the dryness. 2. Class 2 severe obesity due to excess calories with serious comorbidity and body mass index (BMI) of 39.0 to 39.9 in adult [facility])  Assessment & Plan:  Chronic-not stable:  Discussed importance of treating obstructive sleep apnea and getting sufficient sleep to assist with weight control. Encouraged him to work on weight loss through diet and exercise. Recommended DASH or Mediterranean diets. Reviewed, analyzed, and documented physiologic data from patient's PAP machine.     This information was analyzed

## 2023-08-18 ENCOUNTER — OFFICE VISIT (OUTPATIENT)
Dept: PULMONOLOGY | Age: 48
End: 2023-08-18
Payer: COMMERCIAL

## 2023-08-18 ENCOUNTER — TELEPHONE (OUTPATIENT)
Dept: PULMONOLOGY | Age: 48
End: 2023-08-18

## 2023-08-18 VITALS
WEIGHT: 301.8 LBS | OXYGEN SATURATION: 93 % | SYSTOLIC BLOOD PRESSURE: 124 MMHG | BODY MASS INDEX: 40 KG/M2 | DIASTOLIC BLOOD PRESSURE: 83 MMHG | HEART RATE: 79 BPM | HEIGHT: 73 IN

## 2023-08-18 DIAGNOSIS — G47.33 OBSTRUCTIVE SLEEP APNEA SYNDROME: Primary | ICD-10-CM

## 2023-08-18 DIAGNOSIS — E66.01 CLASS 2 SEVERE OBESITY DUE TO EXCESS CALORIES WITH SERIOUS COMORBIDITY AND BODY MASS INDEX (BMI) OF 39.0 TO 39.9 IN ADULT (HCC): ICD-10-CM

## 2023-08-18 PROCEDURE — 99214 OFFICE O/P EST MOD 30 MIN: CPT | Performed by: NURSE PRACTITIONER

## 2023-08-18 ASSESSMENT — SLEEP AND FATIGUE QUESTIONNAIRES
HOW LIKELY ARE YOU TO NOD OFF OR FALL ASLEEP WHILE SITTING AND READING: 0
ESS TOTAL SCORE: 6
HOW LIKELY ARE YOU TO NOD OFF OR FALL ASLEEP IN A CAR, WHILE STOPPED FOR A FEW MINUTES IN TRAFFIC: 0
HOW LIKELY ARE YOU TO NOD OFF OR FALL ASLEEP WHEN YOU ARE A PASSENGER IN A CAR FOR AN HOUR WITHOUT A BREAK: 1
HOW LIKELY ARE YOU TO NOD OFF OR FALL ASLEEP WHILE LYING DOWN TO REST IN THE AFTERNOON WHEN CIRCUMSTANCES PERMIT: 3
HOW LIKELY ARE YOU TO NOD OFF OR FALL ASLEEP WHILE SITTING AND TALKING TO SOMEONE: 0
HOW LIKELY ARE YOU TO NOD OFF OR FALL ASLEEP WHILE SITTING INACTIVE IN A PUBLIC PLACE: 1
HOW LIKELY ARE YOU TO NOD OFF OR FALL ASLEEP WHILE SITTING QUIETLY AFTER LUNCH WITHOUT ALCOHOL: 0
HOW LIKELY ARE YOU TO NOD OFF OR FALL ASLEEP WHILE WATCHING TV: 1

## 2023-08-18 NOTE — ASSESSMENT & PLAN NOTE
Chronic - Stable but not sure if fully controlled: Reviewed and analyzed results of physiologic download from patient's machine and reviewed with patients. Supplies and parts as needed for the machine. These are medically necessary. Limit caffeine use after 3 PM. Based on the analyzed data, we will continue with current settings. Discussed trial of pressure increase but he declined at this time as he worried about interference with the mask fit. Discussed starting exercise regularly to see if it helps with extending sleep time. Discussed taking power nap but no longer than 30 min during the daytime if need to. He will return in 3 mo for follow up. Will consider in lab titration study if no improvement or worsening of symptoms. Will also consider use of stimulants such as armodafinil/modafinil if daytime sleepiness starts to affect driving or work performance. Encouraged the patient to continue to use his machine each night. Encouraged the patient to contact the office with any questions or concerns.

## 2023-11-29 ENCOUNTER — TELEMEDICINE (OUTPATIENT)
Dept: PULMONOLOGY | Age: 48
End: 2023-11-29
Payer: COMMERCIAL

## 2023-11-29 DIAGNOSIS — E66.01 CLASS 2 SEVERE OBESITY DUE TO EXCESS CALORIES WITH SERIOUS COMORBIDITY AND BODY MASS INDEX (BMI) OF 39.0 TO 39.9 IN ADULT (HCC): ICD-10-CM

## 2023-11-29 DIAGNOSIS — G47.33 OBSTRUCTIVE SLEEP APNEA SYNDROME: Primary | ICD-10-CM

## 2023-11-29 PROCEDURE — 99214 OFFICE O/P EST MOD 30 MIN: CPT | Performed by: NURSE PRACTITIONER

## 2023-11-29 ASSESSMENT — SLEEP AND FATIGUE QUESTIONNAIRES
HOW LIKELY ARE YOU TO NOD OFF OR FALL ASLEEP IN A CAR, WHILE STOPPED FOR A FEW MINUTES IN TRAFFIC: 0
HOW LIKELY ARE YOU TO NOD OFF OR FALL ASLEEP WHEN YOU ARE A PASSENGER IN A CAR FOR AN HOUR WITHOUT A BREAK: 0
ESS TOTAL SCORE: 2
HOW LIKELY ARE YOU TO NOD OFF OR FALL ASLEEP WHILE SITTING AND TALKING TO SOMEONE: 0
HOW LIKELY ARE YOU TO NOD OFF OR FALL ASLEEP WHILE WATCHING TV: 0
HOW LIKELY ARE YOU TO NOD OFF OR FALL ASLEEP WHILE SITTING INACTIVE IN A PUBLIC PLACE: 0
HOW LIKELY ARE YOU TO NOD OFF OR FALL ASLEEP WHILE LYING DOWN TO REST IN THE AFTERNOON WHEN CIRCUMSTANCES PERMIT: 1
HOW LIKELY ARE YOU TO NOD OFF OR FALL ASLEEP WHILE SITTING QUIETLY AFTER LUNCH WITHOUT ALCOHOL: 0
HOW LIKELY ARE YOU TO NOD OFF OR FALL ASLEEP WHILE SITTING AND READING: 1

## 2023-11-29 NOTE — ASSESSMENT & PLAN NOTE
Chronic - Stable: Reviewed and analyzed results of physiologic download from patient's machine and reviewed with patients. Continues to do well with his machine. He is compliant and getting good symptom control. Supplies and parts as needed for the machine. These are medically necessary. Limit caffeine use after 3 PM. Based on the analyzed data, we will continue with current settings. Discussed lowering the pressure due to aerophagia and causing some discomfort in the morning but he states it is manageable and would like to monitor it for now. He was instructed to contact the office if symptoms get worse and we will lower the pressure settings. He will continue to work on staying/sleeping longer on the machine, aiming around 7 hours nightly. We will follow up in 6 mo for follow up. We will try to remotely access his machine to see if we can set it up remotely. If we can't, he will bring the machine to the office or DME so we can set it up with his current settings. He was instructed to return sooner or contact the office if experiences new or worsening of symptoms. Encouraged him to continue to use his machine each night. Encouraged the patient to contact the office with any questions or concerns.

## 2023-11-29 NOTE — PROGRESS NOTES
Nevaeh Machuca CNP  Christiesri Reece CNP [facility] [address] [facility], 0[facility] [phone] [address], [address] [phone]     Video Visit- Follow up      Assessment/Plan:      1. Obstructive sleep apnea syndrome  Assessment & Plan:  Chronic - Stable: Reviewed and analyzed results of physiologic download from patient's machine and reviewed with patients. Continues to do well with his machine. He is compliant and getting good symptom control. Supplies and parts as needed for the machine. These are medically necessary. Limit caffeine use after 3 PM. Based on the analyzed data, we will continue with current settings. Discussed lowering the pressure due to aerophagia and causing some discomfort in the morning but he states it is manageable and would like to monitor it for now. He was instructed to contact the office if symptoms get worse and we will lower the pressure settings. He will continue to work on staying/sleeping longer on the machine, aiming around 7 hours nightly. We will follow up in 6 mo for follow up. We will try to remotely access his machine to see if we can set it up remotely. If we can't, he will bring the machine to the office or DME so we can set it up with his current settings. He was instructed to return sooner or contact the office if experiences new or worsening of symptoms. Encouraged him to continue to use his machine each night. Encouraged the patient to contact the office with any questions or concerns. 2. Class 2 severe obesity due to excess calories with serious comorbidity and body mass index (BMI) of 39.0 to 39.9 in adult [facility])  Assessment & Plan:   Chronic-not stable:  Discussed importance of treating obstructive sleep apnea and getting sufficient sleep to assist with weight control. Encouraged him to work on weight loss through diet and exercise.  Recommended DASH or Mediterranean
Recommended DASH or Mediterranean diets. Reviewed, analyzed, and documented physiologic data from patient's PAP machine. This information was analyzed to assess complexity and medical decision making in regards to further testing and management. The primary encounter diagnosis was Obstructive sleep apnea syndrome. A diagnosis of Class 2 severe obesity due to excess calories with serious comorbidity and body mass index (BMI) of 39.0 to 39.9 in adult [facility]) was also pertinent to this visit. The chronic medical conditions listed are directly related to the primary diagnosis listed above. The management of the primary diagnosis affects the secondary diagnosis and vice versa. Subjective:   Subjective   Patient ID: Lizzeth Weathers is a 46 y.o. male. Chief Complaint   Patient presents with    Sleep Apnea     Patient is in [place], [place] for . HPI:  Machine Modem/Download Info:                                                 States he has been doing well with his machine. Pressure feels comfortable, and he is able to sleep through the night and is waking rested in the morning for the most part. States his mask is fitting well and comfortable with minimal leak. Denies headache, aerophagia, nosebleed, dry mouth, nasal congestion, and drowsiness while driving.        DME Company - {Pulm [id number]}    [place] Sleepiness Score: 2    Social History     Socioeconomic History    Marital status:      Spouse name: Not on file    Number of children: Not on file    Years of education: Not on file    Highest education level: Not on file   Occupational History    Not on file   Tobacco Use    Smoking status: Never    Smokeless tobacco: Never   Vaping Use    Vaping Use: Never used   Substance and Sexual Activity    Alcohol use: Yes     Comment: occassional    Drug use: No    Sexual activity: Yes     Partners: Female   Other Topics Concern    Not on file   Social History Narrative    Not on

## 2025-02-06 ENCOUNTER — OFFICE VISIT (OUTPATIENT)
Age: 50
End: 2025-02-06

## 2025-02-06 VITALS
OXYGEN SATURATION: 98 % | DIASTOLIC BLOOD PRESSURE: 80 MMHG | HEART RATE: 76 BPM | TEMPERATURE: 97.3 F | HEIGHT: 73 IN | WEIGHT: 296 LBS | SYSTOLIC BLOOD PRESSURE: 126 MMHG | BODY MASS INDEX: 39.23 KG/M2

## 2025-02-06 DIAGNOSIS — N30.01 ACUTE CYSTITIS WITH HEMATURIA: ICD-10-CM

## 2025-02-06 DIAGNOSIS — R30.0 DYSURIA: Primary | ICD-10-CM

## 2025-02-06 LAB
BILIRUBIN, POC: ABNORMAL
BLOOD URINE, POC: ABNORMAL
CLARITY, POC: ABNORMAL
COLOR, POC: ABNORMAL
GLUCOSE URINE, POC: ABNORMAL MG/DL
KETONES, POC: ABNORMAL MG/DL
LEUKOCYTE EST, POC: ABNORMAL
NITRITE, POC: POSITIVE
PH, POC: 6
PROTEIN, POC: 100 MG/DL
SPECIFIC GRAVITY, POC: >=1.03
UROBILINOGEN, POC: 0.2 MG/DL

## 2025-02-06 RX ORDER — CEFUROXIME AXETIL 500 MG/1
500 TABLET ORAL 2 TIMES DAILY
Qty: 14 TABLET | Refills: 0 | Status: SHIPPED | OUTPATIENT
Start: 2025-02-06 | End: 2025-02-13

## 2025-02-06 NOTE — PATIENT INSTRUCTIONS
Keep hydrated, tylenol/ibuprofen  (of no contraindications) as felt needed for pain/temperature  Take medication as prescribed  If urine culture comes back (negative), schedule appointment to see your PCP   for further evaluation as to cause of your symptoms.   recommend follow up repeat urine with PCP in 4-6 weeks to make sure blood has cleared and no longer present  Follow up in 7-10 days if not better,   return or see PCP sooner or go to the ER if symptoms worse/feeling worse, or  has new symptoms or concerns

## 2025-02-06 NOTE — PROGRESS NOTES
Ross Whiting (:  1975) is a 49 y.o. male,New patient, here for evaluation of the following chief complaint(s):  Urinary Tract Infection (Cloudy urine, pinkish tone, smelly, and painful)      ASSESSMENT/PLAN:    ICD-10-CM    1. Dysuria  R30.0 POCT Urinalysis no Micro      2. Acute cystitis with hematuria  N30.01 cefUROXime (CEFTIN) 500 MG tablet        Results for orders placed or performed in visit on 25   POCT Urinalysis no Micro   Result Value Ref Range    Color, UA Orange     Clarity, UA Cloudy     Glucose, UA POC Neg mg/dL    Bilirubin, UA Neg     Ketones, UA Neg mg/dL    Spec Grav, UA >=1.030     Blood, UA POC Large     pH, UA 6.0     Protein, UA  mg/dL    Urobilinogen, UA 0.2 mg/dL    Leukocytes, UA Large     Nitrite, UA Positive       No history of kidney stones-no renal colic   Differential diagnosis included but not limited to interstitial cystitis,  bladder lesion, pyelonephritis,   prostatitis    Keep hydrated, tylenol/ibuprofen  (of no contraindications) as felt needed for pain/temperature  Take medication as prescribed  If urine culture comes back (negative), schedule appointment to see your PCP   for further evaluation as to cause of your symptoms.   recommend follow up repeat urine with PCP in 4-6 weeks to make sure blood has cleared and no longer present  Follow up in 7-10 days if not better,   return or see PCP sooner or go to the ER if symptoms worse/feeling worse, or  has new symptoms or concerns           Hno prior hx of uti prostate or stoneas otc prydiun to start         SUBJECTIVE/OBJECTIVE:  Patient presents with:  Urinary Tract Infection: Cloudy urine, pinkish tone, smelly, and painful  No history of kidney stone or prostate issues  No STD concerns         History provided by:  Patient   used: No    Urinary Tract Infection        Vitals:    25 0837 25 0909   BP: 134/74 126/80   Pulse: 76    Temp: 97.3 °F (36.3 °C)    TempSrc:

## 2025-02-08 LAB
BACTERIA UR CULT: ABNORMAL
ORGANISM: ABNORMAL

## 2025-02-08 ASSESSMENT — ENCOUNTER SYMPTOMS: BACK PAIN: 0

## 2025-05-27 ENCOUNTER — TELEMEDICINE (OUTPATIENT)
Dept: PULMONOLOGY | Age: 50
End: 2025-05-27
Payer: COMMERCIAL

## 2025-05-27 DIAGNOSIS — G47.33 OBSTRUCTIVE SLEEP APNEA SYNDROME: Primary | Chronic | ICD-10-CM

## 2025-05-27 DIAGNOSIS — E66.812 CLASS 2 SEVERE OBESITY DUE TO EXCESS CALORIES WITH SERIOUS COMORBIDITY AND BODY MASS INDEX (BMI) OF 39.0 TO 39.9 IN ADULT (HCC): Chronic | ICD-10-CM

## 2025-05-27 DIAGNOSIS — E66.01 CLASS 2 SEVERE OBESITY DUE TO EXCESS CALORIES WITH SERIOUS COMORBIDITY AND BODY MASS INDEX (BMI) OF 39.0 TO 39.9 IN ADULT (HCC): Chronic | ICD-10-CM

## 2025-05-27 PROCEDURE — G2211 COMPLEX E/M VISIT ADD ON: HCPCS | Performed by: NURSE PRACTITIONER

## 2025-05-27 PROCEDURE — 99214 OFFICE O/P EST MOD 30 MIN: CPT | Performed by: NURSE PRACTITIONER

## 2025-05-27 ASSESSMENT — SLEEP AND FATIGUE QUESTIONNAIRES
HOW LIKELY ARE YOU TO NOD OFF OR FALL ASLEEP WHILE SITTING INACTIVE IN A PUBLIC PLACE: SLIGHT CHANCE OF DOZING
ESS TOTAL SCORE: 3
HOW LIKELY ARE YOU TO NOD OFF OR FALL ASLEEP WHILE SITTING AND READING: WOULD NEVER DOZE
HOW LIKELY ARE YOU TO NOD OFF OR FALL ASLEEP WHILE SITTING QUIETLY AFTER LUNCH WITHOUT ALCOHOL: WOULD NEVER DOZE
HOW LIKELY ARE YOU TO NOD OFF OR FALL ASLEEP WHILE WATCHING TV: SLIGHT CHANCE OF DOZING
HOW LIKELY ARE YOU TO NOD OFF OR FALL ASLEEP WHILE LYING DOWN TO REST IN THE AFTERNOON WHEN CIRCUMSTANCES PERMIT: SLIGHT CHANCE OF DOZING
HOW LIKELY ARE YOU TO NOD OFF OR FALL ASLEEP WHEN YOU ARE A PASSENGER IN A CAR FOR AN HOUR WITHOUT A BREAK: WOULD NEVER DOZE
HOW LIKELY ARE YOU TO NOD OFF OR FALL ASLEEP WHILE SITTING AND TALKING TO SOMEONE: WOULD NEVER DOZE
HOW LIKELY ARE YOU TO NOD OFF OR FALL ASLEEP IN A CAR, WHILE STOPPED FOR A FEW MINUTES IN TRAFFIC: WOULD NEVER DOZE

## 2025-05-27 NOTE — PROGRESS NOTES
[facility]  [address]  [address]  [place] [address]  P- [phone]  F- [phone]   Video Visit- Follow up      Assessment/Plan:      1. Obstructive sleep apnea syndrome  Assessment & Plan:  Chronic-Stable: Reviewed and analyzed results of physiologic download from patient's machine and reviewed with patient.  Supplies and parts as needed for his machine.  These are medically necessary.  Limit caffeine use after 3pm. Based on the analyzed data will continue with current settings. Stable on his machine at current settings, getting benefit from the use, and having minimal side effects. Will see him back in 1 year. Encouraged him to contact the office with any questions or concerns.     2. Class 2 severe obesity due to excess calories with serious comorbidity and body mass index (BMI) of 39.0 to 39.9 in adult (HCC)  Assessment & Plan:  Chronic-not stable:  Discussed importance of treating obstructive sleep apnea and getting sufficient sleep to assist with weight control.  Discussed weight gain and/or weight loss may require adjustments to machine settings. Encouraged him to work on weight loss through diet and exercise.         Reviewed, analyzed, and documented physiologic data from patient's PAP machine.    This information was analyzed to assess complexity and medical decision making in regards to further testing and management.    The primary encounter diagnosis was Obstructive sleep apnea syndrome. A diagnosis of Class 2 severe obesity due to excess calories with serious comorbidity and body mass index (BMI) of 39.0 to 39.9 in adult (HCC) was also pertinent to this visit. The chronic medical conditions listed are directly related to the primary diagnosis listed above.  The management of the primary diagnosis affects the secondary diagnosis and vice versa.       Subjective:     Patient ID: Ross Whiting is a 49 y.o. male.    Chief Complaint   Patient

## 2025-05-27 NOTE — PROGRESS NOTES
Diagnosis: [x] JOSE (G47.33) [] CSA (G47.31) [] Apnea (G47.30)   Length of Need: [x] 15 Months [] 99 Months [] Other:   Machine (BEN!): [] Respironics Dream Station      Auto [] ResMed AirSense     Auto [] Other:     []  CPAP () [] Bilevel ()   Mode: [] Auto [] Spontaneous    Mode: [] Auto [] Spontaneous            Comfort Settings:      Humidifier: [] Heated ()        [x] Water chamber replacement ()/ 1 per 6 months        Mask:   [x] Nasal () /1 per 3 months [] Full Face () /1 per 3 months   [x] Patient choice -Size and fit mask [] Patient Choice - Size and fit mask   [] Dispense: [] Dispense:   [x] Headgear () / 1 per 3 months [] Headgear () / 1 per 3 months   [x] Replacement Nasal Cushion ()/2 per month [] Interface Replacement ()/1 per month   [] Replacement Nasal Pillows ()/2 per month         Tubing: [x] Heated ()/1 per 3 months    [] Standard ()/1 per 3 months [] Other:           Filters: [x] Non-disposable ()/1 per 6 months     [x] Ultra-Fine, Disposable ()/2 per month        Miscellaneous: [x] Chin Strap ()/ 1 per 6 months [] O2 bleed-in:        LPM   [] Oxymetry on CPAP/Bilevel []  Other:         Start Order Date: 05/27/25    MEDICAL JUSTIFICATION:  I, the undersigned, certify that the above prescribed supplies are medically necessary for this patient’s wellbeing.  In my opinion, the supplies are both reasonable and necessary in reference to accepted standards of medicalpractice in treatment of this patient’s condition.    RICO OVALLES NP    NPI: [id number]       Order Signed Date: 05/27/25  [facility]  Pulmonary, Sleep, and Critical Care    Pulmonary, Sleep, and Critical Care  [facility] [address]                          [address] [place] [address] [address]  [place] [address]                                    [place] [address]  Phone: [phone]    Fax: